# Patient Record
Sex: FEMALE | Race: WHITE
[De-identification: names, ages, dates, MRNs, and addresses within clinical notes are randomized per-mention and may not be internally consistent; named-entity substitution may affect disease eponyms.]

---

## 2018-04-30 ENCOUNTER — HOSPITAL ENCOUNTER (EMERGENCY)
Dept: HOSPITAL 17 - PHEFT | Age: 38
Discharge: HOME | End: 2018-04-30
Payer: COMMERCIAL

## 2018-04-30 VITALS — DIASTOLIC BLOOD PRESSURE: 91 MMHG | SYSTOLIC BLOOD PRESSURE: 152 MMHG

## 2018-04-30 VITALS — BODY MASS INDEX: 50.02 KG/M2 | HEIGHT: 64 IN | WEIGHT: 293 LBS

## 2018-04-30 VITALS
RESPIRATION RATE: 16 BRPM | TEMPERATURE: 98 F | DIASTOLIC BLOOD PRESSURE: 105 MMHG | SYSTOLIC BLOOD PRESSURE: 176 MMHG | HEART RATE: 109 BPM

## 2018-04-30 DIAGNOSIS — J44.9: ICD-10-CM

## 2018-04-30 DIAGNOSIS — I25.2: ICD-10-CM

## 2018-04-30 DIAGNOSIS — E78.00: ICD-10-CM

## 2018-04-30 DIAGNOSIS — M19.90: ICD-10-CM

## 2018-04-30 DIAGNOSIS — I10: ICD-10-CM

## 2018-04-30 DIAGNOSIS — F31.9: ICD-10-CM
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
## 2018-04-30 DIAGNOSIS — F41.9: ICD-10-CM

## 2018-04-30 DIAGNOSIS — S39.012A: Primary | ICD-10-CM

## 2018-04-30 PROCEDURE — 96372 THER/PROPH/DIAG INJ SC/IM: CPT

## 2018-04-30 PROCEDURE — 84703 CHORIONIC GONADOTROPIN ASSAY: CPT

## 2018-04-30 PROCEDURE — 99283 EMERGENCY DEPT VISIT LOW MDM: CPT

## 2018-04-30 PROCEDURE — 72100 X-RAY EXAM L-S SPINE 2/3 VWS: CPT

## 2018-04-30 NOTE — PD
HPI


Chief Complaint:  Back/ Neck Pain or Injury


Time Seen by Provider:  09:10


Travel History


International Travel<30 days:  No


Contact w/Intl Traveler<30days:  No


Traveled to known affect area:  No





History of Present Illness


HPI


37-year-old female with nontraumatic low back pain 1 week.  Cannot recall 

specific injury or trauma.  She reports pain is located to her low back.  Worse 

with movement and twisting of the torso.  Slightly relieved with rest.  Symptom 

severity is mild to moderate.  Slightly relieved with over-the-counter Motrin.  

She denies fever, incontinence, saddle anesthesia, paresthesia or weakness of 

the extremities.





PFSH


Past Medical History


Arthritis:  Yes


Asthma:  Yes


Autoimmune Disease:  No


Bipolar Disorder:  Yes


Anxiety:  Yes


Depression:  No


Heart Rhythm Problems:  Yes (IRREGULAR)


Cancer:  No


Cardiac Catheterization:  Yes


Cardiovascular Problems:  Yes


High Cholesterol:  Yes


Chemotherapy:  No


Chest Pain:  Yes


Congestive Heart Failure:  No


COPD:  Yes


Cerebrovascular Accident:  No


Diabetes:  No


Diminished Hearing:  No


Endocrine:  No


Gastrointestinal Disorders:  Yes (choleycystectomy 2012)


GERD:  No


Genitourinary:  Yes


Headaches:  No


Hiatal Hernia:  No


Hypertension:  Yes


Immune Disorder:  No


Implanted Vascular Access Dvce:  No


Kidney Stones:  Yes ()


Musculoskeletal:  Yes (DDD)


Neurologic:  Yes


Psychiatric:  Yes


Reproductive:  No


Respiratory:  Yes


Immunizations Current:  Yes


Migraines:  Yes


Myocardial Infarction:  Yes ("mild heart attack")


Radiation Therapy:  No


Renal Failure:  No


Seizures:  No


Sickle Cell Disease:  No


Sleep Apnea:  Yes (MATT)


Thyroid Disease:  No


Ulcer:  No


Influenza Vaccination:  Yes


PNEUMOCCOCAL Vaccine (Year):  


Pregnant?:  Not Pregnant


Menopausal:  No


:  0





Past Surgical History


Abdominal Surgery:  Yes (gastric bi-pass 2013. )


AICD:  No


Arteriovenous Shunt:  No


Cardiac Surgery:  No


Cholecystectomy:  Yes


Coronary Artery Bypass Graft:  No


Ear Surgery:  No


Endocrine Surgery:  Yes (TONSILLECTOMY AND ADENOIDECTOMY-4 YRS OLD)


Eye Surgery:  No


Genitourinary Surgery:  No


Gynecologic Surgery:  No


Insulin Pump:  No


Joint Replacement:  No


Neurologic Surgery:  No


Oral Surgery:  No


Pacemaker:  No


Thoracic Surgery:  No


Tonsillectomy:  Yes


Other Surgery:  Yes (ADENOIDS)





Family History


Family Myocardial Infarction:  Yes





Social History


Alcohol Use:  No


Tobacco Use:  No


Substance Use:  No





Allergies-Medications


(Allergen,Severity, Reaction):  


Coded Allergies:  


     Fish Containing Products (Unverified  Allergy, Severe, Rash, 18)


     alprazolam (Unverified  Allergy, Severe, RASH???, 18)


     levetiracetam (Unverified  Allergy, Severe, Swelling, 18)


     lorazepam (Unverified  Allergy, Severe, RASH??, 18)


     metoclopramide (Unverified  Allergy, Severe, Swelling, 18)


     nitrofurantoin (Unverified  Allergy, Severe, HIVES, 18)


     oxycodone (Unverified  Allergy, Severe, RASH, 18)


     tramadol (Unverified  Allergy, Severe, UNKNOWN, 18)


     acetaminophen (Unverified  Allergy, Mild, Rash, 18)


     azithromycin (Unverified  Allergy, Mild, rash, 18)


     codeine (Unverified  Allergy, Mild, Rash, 18)


Reported Meds & Prescriptions





Reported Meds & Active Scripts


Active


Reported


Clonidine (Clonidine HCl) 0.1 Mg Tab 0.1 Mg PO HS


Nifedipine 20 Mg Cap 30 Mg PO BID


Labetalol (Labetalol HCl) 300 Mg Tab 300 Mg PO BID








Review of Systems


Except as stated in HPI:  all other systems reviewed are Neg


General / Constitutional:  No: Fever


Genitourinary:  No: Dysuria





Physical Exam


Narrative


GENERAL: Alert and well-appearing 37-year-old


SKIN: Warm and dry.


HEAD: Normocephalic.


EYES: No scleral icterus. No injection or drainage. 


NECK: Supple


CARDIOVASCULAR: Regular rate and rhythm


RESPIRATORY: Breath sounds equal bilaterally. No accessory muscle use.


GASTROINTESTINAL: Abdomen soft, non-tender, nondistended. 


MUSCULOSKELETAL: No cyanosis, or edema.  Normal strength and sensation in lower 

extremities.


BACK: +TTP lumbar spine.  No erythema, fluctuance or evidence of pilonidal 

cyst.  Without obvious deformity. No CVA tenderness.








Data


Data


Last Documented VS





Vital Signs








  Date Time  Temp Pulse Resp B/P (MAP) Pulse Ox O2 Delivery O2 Flow Rate FiO2


 


18 08:26 98.0 109 16 176/105 (128)    








Orders





 Orders


Ed Urine Pregnancytest Poc (18 09:32)


Spine, Lumbar - Ltd (Ap & Lat) (18 )








MDM


Medical Decision Making


Medical Screen Exam Complete:  Yes


Emergency Medical Condition:  Yes


Interpretation(s)


Urine pregnancy negative


Differential Diagnosis


Lumbar strain, herniated disc, lumbar fracture, pilonidal cyst


Narrative Course


29-year-old female here with low back pain 1 week.  She is well-appearing.





Diagnosis





 Primary Impression:  


 Lumbar strain


 Qualified Codes:  S39.012A - Strain of muscle, fascia and tendon of lower back

, initial encounter


Referrals:  


Primary Care Physician





***Additional Instructions:  





Medication as directed


Light stretching


Follow-up with your primary doctor


Scripts


Methocarbamol (Robaxin) 750 Mg Tab


750 MG PO QID for Muscle Spasm, #15 TAB 0 Refills


   Prov: Bina Lugo         18 


Meloxicam (Meloxicam) 15 Mg Tab


15 MG PO DAILY for Arthritis Pain, #30 TAB 0 Refills


   Prov: Bina Lugo         18


Disposition:  01 DISCHARGE HOME


Condition:  Stable











Bina Lugo 2018 09:46

## 2018-04-30 NOTE — RADRPT
EXAM DATE/TIME:  04/30/2018 09:46 

 

[facility] COMPARISON:     

No previous studies available for comparison.

 

                     

INDICATIONS :     

Low back pain

                     

 

MEDICAL HISTORY :     

Hypertension.  Myocardial infarction.     DDD   

 

SURGICAL HISTORY :     

Gastric bypass. Cholecystectomy.  

 

ENCOUNTER:     

Initial                                        

 

ACUITY:     

2 weeks      

 

PAIN SCORE:     

9/10

 

LOCATION:       

low back

 

FINDINGS:     

There is a moderate severity curvature of the thoracolumbar spine convex towards the right.  In later
al projection, vertebral bodies of the lumbar spine are in normal alignment with preservation of vert
ebral body height.  Posterior elements pedicles and transverse processes are intact.  Flocculent calc
ification in the pelvis probably related to uterine fibroids.  Moderate stool within the pelvic colon
.

 

CONCLUSION:     

No evidence of compression deformity or spondylolisthesis.  Moderate right scoliosis.

 

 

 

 Thad Mcguire MD on April 30, 2018 at 10:07           

Board Certified Radiologist.

 This report was verified electronically.

## 2018-06-06 ENCOUNTER — HOSPITAL ENCOUNTER (EMERGENCY)
Dept: HOSPITAL 17 - PHEFT | Age: 38
Discharge: HOME | End: 2018-06-06
Payer: COMMERCIAL

## 2018-06-06 VITALS — HEIGHT: 64 IN | WEIGHT: 293 LBS | BODY MASS INDEX: 50.02 KG/M2

## 2018-06-06 VITALS
TEMPERATURE: 99.1 F | HEART RATE: 92 BPM | RESPIRATION RATE: 18 BRPM | DIASTOLIC BLOOD PRESSURE: 96 MMHG | SYSTOLIC BLOOD PRESSURE: 161 MMHG | OXYGEN SATURATION: 98 %

## 2018-06-06 DIAGNOSIS — S62.641A: Primary | ICD-10-CM

## 2018-06-06 DIAGNOSIS — J44.9: ICD-10-CM

## 2018-06-06 DIAGNOSIS — I10: ICD-10-CM
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
## 2018-06-06 DIAGNOSIS — E78.00: ICD-10-CM

## 2018-06-06 DIAGNOSIS — I25.2: ICD-10-CM

## 2018-06-06 DIAGNOSIS — G47.33: ICD-10-CM

## 2018-06-06 DIAGNOSIS — F41.9: ICD-10-CM

## 2018-06-06 DIAGNOSIS — F31.9: ICD-10-CM

## 2018-06-06 PROCEDURE — 73130 X-RAY EXAM OF HAND: CPT

## 2018-06-06 PROCEDURE — 29130 APPL FINGER SPLINT STATIC: CPT

## 2018-06-06 NOTE — RADRPT
EXAM DATE:  6/6/2018 9:04 PM EDT

AGE/SEX:        37 years / Female



INDICATIONS:  Left 1st and 2nd metacarpal pain after patient hit hand today



CLINICAL DATA:  This is the patient's initial encounter. Patient reports that signs and symptoms have
 been present for 1 day and indicates a pain score of 5/10. 

                                                                          

MEDICAL/SURGICAL HISTORY:       None. None.



COMPARISON:         None. 





FINDINGS:  

Bony structures are intact and in normal alignment. Osseous density is normal.  Soft tissues are unre
markable.  No radiopaque foreign bodies seen.   



CONCLUSION: 

Negative examination









Electronically signed by: Thad Reyes MD  6/6/2018 9:17 PM EDT

## 2018-06-06 NOTE — PD
HPI


Chief Complaint:  Musculoskeletal Complaint


Time Seen by Provider:  20:44


Travel History


International Travel<30 days:  No


Contact w/Intl Traveler<30days:  No


Traveled to known affect area:  No





History of Present Illness


HPI


37-year-old female complains of left hand pain.  Patient states that she found 

this evening.  Patient denies loss of consciousness.  Patient denies any 

headache or neck pain.  Patient states that she hyperflexed her left index 

finger.  Patient complained of sharp pain localized the left hand especially at 

the base of the left index finger.  Patient denies any pain radiation.  Patient 

states that the pain is worse with movement of the fingers.  On a scale of 1-10 

the pain is an 8.  Patient denies any chance of being pregnant.





PFSH


Past Medical History


Arthritis:  Yes


Asthma:  Yes


Autoimmune Disease:  No


Bipolar Disorder:  Yes


Anxiety:  Yes


Depression:  No


Heart Rhythm Problems:  Yes (IRREGULAR)


Cancer:  No


Cardiac Catheterization:  Yes


Cardiovascular Problems:  Yes


High Cholesterol:  Yes


Chemotherapy:  No


Chest Pain:  Yes


Congestive Heart Failure:  No


COPD:  Yes


Cerebrovascular Accident:  No


Diabetes:  No


Diminished Hearing:  No


Endocrine:  No


Gastrointestinal Disorders:  Yes (choleycystectomy 2012)


GERD:  No


Genitourinary:  Yes


Headaches:  No


Hiatal Hernia:  No


Hypertension:  Yes


Immune Disorder:  No


Implanted Vascular Access Dvce:  No


Kidney Stones:  Yes ()


Musculoskeletal:  Yes (DDD)


Neurologic:  Yes


Psychiatric:  Yes


Reproductive:  No


Respiratory:  Yes


Immunizations Current:  Yes


Migraines:  Yes


Myocardial Infarction:  Yes ("mild heart attack")


Radiation Therapy:  No


Renal Failure:  No


Seizures:  No


Sickle Cell Disease:  No


Sleep Apnea:  Yes (MATT)


Thyroid Disease:  No


Ulcer:  No


PNEUMOCCOCAL Vaccine (Year):  


Pregnant?:  Unknown


LMP:  18


Menopausal:  No


:  0





Past Surgical History


Abdominal Surgery:  Yes (gastric bi-pass 2013. )


AICD:  No


Arteriovenous Shunt:  No


Cardiac Surgery:  No


Cholecystectomy:  Yes


Coronary Artery Bypass Graft:  No


Ear Surgery:  No


Endocrine Surgery:  Yes (TONSILLECTOMY AND ADENOIDECTOMY-3 YRS OLD)


Eye Surgery:  No


Genitourinary Surgery:  No


Gynecologic Surgery:  No


Insulin Pump:  No


Joint Replacement:  No


Neurologic Surgery:  No


Oral Surgery:  No


Pacemaker:  No


Thoracic Surgery:  No


Tonsillectomy:  Yes


Other Surgery:  Yes (ADENOIDS)





Social History


Alcohol Use:  No


Tobacco Use:  No


Substance Use:  No





Allergies-Medications


(Allergen,Severity, Reaction):  


Coded Allergies:  


     Fish Containing Products (Unverified  Allergy, Severe, Rash, 18)


     alprazolam (Unverified  Allergy, Severe, RASH???, 18)


     levetiracetam (Unverified  Allergy, Severe, Swelling, 18)


     lorazepam (Unverified  Allergy, Severe, RASH??, 18)


     metoclopramide (Unverified  Allergy, Severe, Swelling, 18)


     nitrofurantoin (Unverified  Allergy, Severe, HIVES, 18)


     oxycodone (Unverified  Allergy, Severe, RASH, 18)


     tramadol (Unverified  Allergy, Severe, UNKNOWN, 18)


     acetaminophen (Unverified  Allergy, Mild, Rash, 18)


     azithromycin (Unverified  Allergy, Mild, rash, 18)


     codeine (Unverified  Allergy, Mild, Rash, 18)


Reported Meds & Prescriptions





Reported Meds & Active Scripts


Active


Reported


Clonidine (Clonidine HCl) 0.1 Mg Tab 0.1 Mg PO HS


Nifedipine 20 Mg Cap 30 Mg PO BID


Labetalol (Labetalol HCl) 300 Mg Tab 300 Mg PO BID








Review of Systems


General / Constitutional:  No: Fever


Eyes:  No: Visual changes


HENT:  No: Headaches


Cardiovascular:  No: Chest Pain or Discomfort


Respiratory:  No: Shortness of Breath


Gastrointestinal:  No: Abdominal Pain


Genitourinary:  No: Dysuria


Musculoskeletal:  Positive: Pain


Skin:  No Rash


Neurologic:  No: Weakness


Psychiatric:  No: Depression


Endocrine:  No: Polydipsia


Hematologic/Lymphatic:  No: Easy Bruising





Physical Exam


Narrative


GENERAL: Well-nourished, well-developed patient.


SKIN: Focused skin assessment warm/dry.


HEAD: Normocephalic.


EYES: No scleral icterus. No injection or drainage. 


NECK: Supple, trachea midline. No JVD or lymphadenopathy.


CARDIOVASCULAR: Regular rate and rhythm without murmurs, gallops, or rubs. 


RESPIRATORY: Breath sounds equal bilaterally. No accessory muscle use.


GASTROINTESTINAL: Abdomen soft, non-tender, nondistended. 


MUSCULOSKELETAL: No cyanosis, or edema. 


BACK: Nontender without obvious deformity. No CVA tenderness.


Patient has soft tissue swelling ecchymosis tenderness distal aspect of the 

second metacarpal at the base of the left index finger.  Limited range of 

motion of the left index finger secondary to pain.





Data


Data


Last Documented VS





Vital Signs








  Date Time  Temp Pulse Resp B/P (MAP) Pulse Ox O2 Delivery O2 Flow Rate FiO2


 


18 20:31 99.1 92 18 161/96 (117) 98   








Orders





 Orders


Hand, Complete ([id number]) (18 20:46)


Splint Or Brace Apply/Monitor (18 21:47)


Ibuprofen (Motrin) (18 22:00)


Ed Discharge Order (18 21:49)








MDM


Medical Decision Making


Medical Screen Exam Complete:  Yes


Emergency Medical Condition:  Yes


Interpretation(s)





Last Impressions








Hand X-Ray 18 Signed





Impressions: 





 CONCLUSION: 





 Negative examination





  





 





  





 





  





 





  





 








Differential Diagnosis


Differential diagnoses including contusion, fracture, dislocation.


Narrative Course


37-year-old female with left hand injury.  Status post fall.  Finger splint.





Diagnosis





 Primary Impression:  


 Fracture of phalanx of left index finger


 Qualified Codes:  S62.641A - Nondisplaced fracture of proximal phalanx of left 

index finger, initial encounter for closed fracture


Patient Instructions:  General Instructions


***Med/Other Pt SpecificInfo:  Prescription(s) given


Scripts


Ibuprofen (Ibuprofen) 600 Mg Tab


600 MG PO TID for Pain, #30 TAB 0 Refills


   Prov: Wali Perez MD         18


Disposition:  01 DISCHARGE HOME


Condition:  Stable











Wali Perez MD 2018 20:51